# Patient Record
Sex: MALE | Race: WHITE | NOT HISPANIC OR LATINO | URBAN - METROPOLITAN AREA
[De-identification: names, ages, dates, MRNs, and addresses within clinical notes are randomized per-mention and may not be internally consistent; named-entity substitution may affect disease eponyms.]

---

## 2019-05-19 ENCOUNTER — INPATIENT (INPATIENT)
Facility: HOSPITAL | Age: 24
LOS: 39 days | Discharge: HOME | End: 2019-06-28
Attending: PSYCHIATRY & NEUROLOGY | Admitting: PSYCHIATRY & NEUROLOGY
Payer: COMMERCIAL

## 2019-05-19 VITALS
RESPIRATION RATE: 18 BRPM | HEART RATE: 92 BPM | SYSTOLIC BLOOD PRESSURE: 143 MMHG | DIASTOLIC BLOOD PRESSURE: 999 MMHG | TEMPERATURE: 97 F | OXYGEN SATURATION: 100 %

## 2019-05-19 NOTE — ED ADULT TRIAGE NOTE - CHIEF COMPLAINT QUOTE
Pt BIBA for erratic behavior at home.  Pt is hyper verbal and hyper Temple in triage.  Pt recently DC from [facility].  Per family pt has had this behavior since DC on Wed.  pmhx. bipolar & Autism.  denies SI/HI/AVH/ETOH/substance.

## 2019-05-20 DIAGNOSIS — F84.0 AUTISTIC DISORDER: ICD-10-CM

## 2019-05-20 DIAGNOSIS — R69 ILLNESS, UNSPECIFIED: ICD-10-CM

## 2019-05-20 DIAGNOSIS — F29 UNSPECIFIED PSYCHOSIS NOT DUE TO A SUBSTANCE OR KNOWN PHYSIOLOGICAL CONDITION: ICD-10-CM

## 2019-05-20 LAB
ALBUMIN SERPL ELPH-MCNC: 4.8 G/DL — SIGNIFICANT CHANGE UP (ref 3.3–5)
ALP SERPL-CCNC: 54 U/L — SIGNIFICANT CHANGE UP (ref 40–120)
ALT FLD-CCNC: 21 U/L — SIGNIFICANT CHANGE UP (ref 4–41)
AMPHET UR-MCNC: NEGATIVE — SIGNIFICANT CHANGE UP
ANION GAP SERPL CALC-SCNC: 13 MMO/L — SIGNIFICANT CHANGE UP (ref 7–14)
APAP SERPL-MCNC: < 15 UG/ML — LOW (ref 15–25)
AST SERPL-CCNC: 26 U/L — SIGNIFICANT CHANGE UP (ref 4–40)
BARBITURATES UR SCN-MCNC: NEGATIVE — SIGNIFICANT CHANGE UP
BASOPHILS # BLD AUTO: 0.02 K/UL — SIGNIFICANT CHANGE UP (ref 0–0.2)
BASOPHILS NFR BLD AUTO: 0.3 % — SIGNIFICANT CHANGE UP (ref 0–2)
BENZODIAZ UR-MCNC: NEGATIVE — SIGNIFICANT CHANGE UP
BILIRUB SERPL-MCNC: 0.3 MG/DL — SIGNIFICANT CHANGE UP (ref 0.2–1.2)
BUN SERPL-MCNC: 12 MG/DL — SIGNIFICANT CHANGE UP (ref 7–23)
CALCIUM SERPL-MCNC: 9.5 MG/DL — SIGNIFICANT CHANGE UP (ref 8.4–10.5)
CANNABINOIDS UR-MCNC: NEGATIVE — SIGNIFICANT CHANGE UP
CHLORIDE SERPL-SCNC: 105 MMOL/L — SIGNIFICANT CHANGE UP (ref 98–107)
CO2 SERPL-SCNC: 23 MMOL/L — SIGNIFICANT CHANGE UP (ref 22–31)
COCAINE METAB.OTHER UR-MCNC: NEGATIVE — SIGNIFICANT CHANGE UP
CREAT SERPL-MCNC: 0.81 MG/DL — SIGNIFICANT CHANGE UP (ref 0.5–1.3)
EOSINOPHIL # BLD AUTO: 0.08 K/UL — SIGNIFICANT CHANGE UP (ref 0–0.5)
EOSINOPHIL NFR BLD AUTO: 1.2 % — SIGNIFICANT CHANGE UP (ref 0–6)
ETHANOL BLD-MCNC: < 10 MG/DL — SIGNIFICANT CHANGE UP
GLUCOSE SERPL-MCNC: 92 MG/DL — SIGNIFICANT CHANGE UP (ref 70–99)
HCT VFR BLD CALC: 37.4 % — LOW (ref 39–50)
HGB BLD-MCNC: 13.4 G/DL — SIGNIFICANT CHANGE UP (ref 13–17)
IMM GRANULOCYTES NFR BLD AUTO: 0.1 % — SIGNIFICANT CHANGE UP (ref 0–1.5)
LITHIUM SERPL-MCNC: < 0.1 MMOL/L — LOW (ref 0.6–1.2)
LYMPHOCYTES # BLD AUTO: 2.66 K/UL — SIGNIFICANT CHANGE UP (ref 1–3.3)
LYMPHOCYTES # BLD AUTO: 39.8 % — SIGNIFICANT CHANGE UP (ref 13–44)
MAGNESIUM SERPL-MCNC: 2 MG/DL — SIGNIFICANT CHANGE UP (ref 1.6–2.6)
MCHC RBC-ENTMCNC: 33.1 PG — SIGNIFICANT CHANGE UP (ref 27–34)
MCHC RBC-ENTMCNC: 35.8 % — SIGNIFICANT CHANGE UP (ref 32–36)
MCV RBC AUTO: 92.3 FL — SIGNIFICANT CHANGE UP (ref 80–100)
METHADONE UR-MCNC: NEGATIVE — SIGNIFICANT CHANGE UP
MONOCYTES # BLD AUTO: 0.9 K/UL — SIGNIFICANT CHANGE UP (ref 0–0.9)
MONOCYTES NFR BLD AUTO: 13.5 % — SIGNIFICANT CHANGE UP (ref 2–14)
NEUTROPHILS # BLD AUTO: 3.01 K/UL — SIGNIFICANT CHANGE UP (ref 1.8–7.4)
NEUTROPHILS NFR BLD AUTO: 45.1 % — SIGNIFICANT CHANGE UP (ref 43–77)
NRBC # FLD: 0 K/UL — SIGNIFICANT CHANGE UP (ref 0–0)
OPIATES UR-MCNC: NEGATIVE — SIGNIFICANT CHANGE UP
OXYCODONE UR-MCNC: NEGATIVE — SIGNIFICANT CHANGE UP
PCP UR-MCNC: NEGATIVE — SIGNIFICANT CHANGE UP
PHOSPHATE SERPL-MCNC: 3.7 MG/DL — SIGNIFICANT CHANGE UP (ref 2.5–4.5)
PLATELET # BLD AUTO: 212 K/UL — SIGNIFICANT CHANGE UP (ref 150–400)
PMV BLD: 9.1 FL — SIGNIFICANT CHANGE UP (ref 7–13)
POTASSIUM SERPL-MCNC: 4.1 MMOL/L — SIGNIFICANT CHANGE UP (ref 3.5–5.3)
POTASSIUM SERPL-SCNC: 4.1 MMOL/L — SIGNIFICANT CHANGE UP (ref 3.5–5.3)
PROT SERPL-MCNC: 7.2 G/DL — SIGNIFICANT CHANGE UP (ref 6–8.3)
RBC # BLD: 4.05 M/UL — LOW (ref 4.2–5.8)
RBC # FLD: 11.9 % — SIGNIFICANT CHANGE UP (ref 10.3–14.5)
SALICYLATES SERPL-MCNC: < 5 MG/DL — LOW (ref 15–30)
SODIUM SERPL-SCNC: 141 MMOL/L — SIGNIFICANT CHANGE UP (ref 135–145)
TSH SERPL-MCNC: 2.67 UIU/ML — SIGNIFICANT CHANGE UP (ref 0.27–4.2)
VALPROATE SERPL-MCNC: 28 UG/ML — LOW (ref 50–100)
WBC # BLD: 6.68 K/UL — SIGNIFICANT CHANGE UP (ref 3.8–10.5)
WBC # FLD AUTO: 6.68 K/UL — SIGNIFICANT CHANGE UP (ref 3.8–10.5)

## 2019-05-20 PROCEDURE — 99285 EMERGENCY DEPT VISIT HI MDM: CPT | Mod: GC

## 2019-05-20 PROCEDURE — 99231 SBSQ HOSP IP/OBS SF/LOW 25: CPT

## 2019-05-20 RX ORDER — HALOPERIDOL DECANOATE 100 MG/ML
5 INJECTION INTRAMUSCULAR EVERY 6 HOURS
Refills: 0 | Status: DISCONTINUED | OUTPATIENT
Start: 2019-05-20 | End: 2019-05-23

## 2019-05-20 RX ORDER — DIPHENHYDRAMINE HCL 50 MG
50 CAPSULE ORAL EVERY 6 HOURS
Refills: 0 | Status: DISCONTINUED | OUTPATIENT
Start: 2019-05-20 | End: 2019-06-28

## 2019-05-20 RX ORDER — OLANZAPINE 15 MG/1
20 TABLET, FILM COATED ORAL
Refills: 0 | Status: DISCONTINUED | OUTPATIENT
Start: 2019-05-20 | End: 2019-05-22

## 2019-05-20 RX ORDER — HALOPERIDOL DECANOATE 100 MG/ML
5 INJECTION INTRAMUSCULAR ONCE
Refills: 0 | Status: DISCONTINUED | OUTPATIENT
Start: 2019-05-20 | End: 2019-05-23

## 2019-05-20 RX ORDER — DIPHENHYDRAMINE HCL 50 MG
50 CAPSULE ORAL ONCE
Refills: 0 | Status: DISCONTINUED | OUTPATIENT
Start: 2019-05-20 | End: 2019-06-19

## 2019-05-20 RX ORDER — DIVALPROEX SODIUM 500 MG/1
750 TABLET, DELAYED RELEASE ORAL
Refills: 0 | Status: DISCONTINUED | OUTPATIENT
Start: 2019-05-20 | End: 2019-05-29

## 2019-05-20 NOTE — ED PROVIDER NOTE - PHYSICAL EXAMINATION
GEN - NAD; well appearing; A+O x3; non-toxic appearing   CARD -s1s2, RRR, no M,G,R;   PULM - CTA b/l, symmetric breath sounds;   ABD:  +BS, ND, NT, soft, no guarding, no rebound, no masses;   BACK: no CVA tenderness, Normal  spine;   EXT: symmetric pulses, 2+ dp, capillary refill < 2 seconds, no clubbing, no cyanosis, no edema   NEURO: alert, CN 2-12 intact, reflexes nl, sensation nl, coordination nl, finger to nose nl, romberg negative, motor 5/5 RUE/LUE/RLE/LLE/EHL/Plantar flexion, no pronator drift, gait nl  PSYCH: internally preoccupied, pacing, with psychomotor agitation, poor eye contact, with hyper-Restoration statements.  No suicidal or homicidal ideation, no hallucinations.
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
## 2019-05-20 NOTE — ED ADULT NURSE NOTE - CHIEF COMPLAINT QUOTE
Pt BIBA for erratic behavior at home.  Pt is hyper verbal and hyper Christianity in triage.  Pt recently DC from [facility].  Per family pt has had this behavior since DC on Wed.  pmhx. bipolar & Autism.  denies SI/HI/AVH/ETOH/substance.

## 2019-05-20 NOTE — ED BEHAVIORAL HEALTH NOTE - BEHAVIORAL HEALTH NOTE
Contacted [facility] with [facility].  Was informed that stewart bed is available, however pt is not appropriate for this type of bed.

## 2019-05-20 NOTE — ED BEHAVIORAL HEALTH ASSESSMENT NOTE - OTHER PAST PSYCHIATRIC HISTORY (INCLUDE DETAILS REGARDING ONSET, COURSE OF ILLNESS, INPATIENT/OUTPATIENT TREATMENT)
reported psychiatric diagnoses of autism spectrum disorder and bipolar disorder, 4 prior psych hospitalizations since June 2018, recently hospitalized at [facility] from last November until last Wednesday 5/15/19, had been on Zyprexa, Depakote, and Prolixn [place] in the past but stopped PO meds recently.

## 2019-05-20 NOTE — ED BEHAVIORAL HEALTH ASSESSMENT NOTE - PSYCHIATRIC ISSUES AND PLAN (INCLUDE STANDING AND PRN MEDICATION)
Will restart olanzapine 20mg BID and Depakote 750mg BID. PRN agitation: Haldol 5mg PO/IM, Benadryl 50mg PO/IM, Ativan 2mg PO/IM. Will need to clarify with [facility] team further details of patient's medication regimen and past trials, need to confirm when next WASHINGTON dose is due.

## 2019-05-20 NOTE — ED BEHAVIORAL HEALTH NOTE - BEHAVIORAL HEALTH NOTE
patient re-evaluated this AM. remains psychotic and delusional though currently in fair behavioral control. I agree with plan for admission. bed obtained on 2N. meets criteria for 939 legal status. legals completed. sign out given to 2N. Recommend EMS transport to the unit.

## 2019-05-20 NOTE — ED BEHAVIORAL HEALTH ASSESSMENT NOTE - SUMMARY
Patient is a 23 year old man, with reported psychiatric diagnoses of autism spectrum disorder and bipolar disorder, 4 prior psych hospitalizations since June 2018, recently hospitalized at [facility] from last November until last Wednesday 5/15/19, had been on Zyprexa, Depakote, and Prolixn [place] in the past but stopped PO meds recently, BIBEMS activated by family for hyperreligious and disorganized behavior as well as escalating aggression. Collateral reveals that patient has long history of mental illness but had been stable until June 2018, has been decompensated since then. Patient presents acutely psychotic with severe thought disorder, superimposed on baseline autism. Patient is a 23 year old man, with reported psychiatric diagnoses of autism spectrum disorder and bipolar disorder, 4 prior psych hospitalizations since June 2018, recently hospitalized at [facility] from last November until last Wednesday 5/15/19, had been on Zyprexa, Depakote, and Prolixn [place] in the past but stopped PO meds recently, BIBEMS activated by family for hyperreligious and disorganized behavior as well as escalating aggression. Collateral reveals that patient has long history of mental illness but had been stable until June 2018, has been decompensated since then. Patient presents acutely psychotic with severe thought disorder, superimposed on baseline autism. No beds in [facility], will hold in ED until bed available.

## 2019-05-20 NOTE — ED BEHAVIORAL HEALTH ASSESSMENT NOTE - DESCRIPTION
GERD per mother had been admitted to [facility] since November then lived at residential program for 2 days, now living with family since Friday. [facility]. Patient observed shaking and at times punching self in ED but able to be verbally de-escalated, refused PO PRN medications.

## 2019-05-20 NOTE — ED ADULT NURSE NOTE - OBJECTIVE STATEMENT
Ambulatory BIBEMS from home where he was reported to have very spontaneous/erratic behavior as per family. Patient is hyperverbal, pacing around the  hallway, gets very excited and starts shaking and getting anxious as he is waiting for providers. Patient is also hypereligious. Just released from [facility] Wednesday and his behaviour has escalated since he got home. Denies SI/HI, AH/VH, ETOH/substance abuse. Patient has PMH of autism and bipolar disorder. VSS. Safety maintained, needs attended, will continue to monitor.

## 2019-05-20 NOTE — ED BEHAVIORAL HEALTH ASSESSMENT NOTE - CURRENT MEDICATION
olanzapine 20mg BID, Depakote 750mg BID, and prolixin W[place] next due on 5/24/19. Patient has refused PO meds since last Wednesday 5/15/19
28-Nov-2017

## 2019-05-20 NOTE — ED PROVIDER NOTE - CLINICAL SUMMARY MEDICAL DECISION MAKING FREE TEXT BOX
23 year old male a/w worsening Protestant and disorganized behavior, no homicidal or suicidal ideations.  - cbc, cmp, ekg

## 2019-05-20 NOTE — ED PROVIDER NOTE - OBJECTIVE STATEMENT
23 year old male with autism ?bipolar arrived with hyper-Jewish behavior, disorganized behavior, with flight of ideas.  Patient was recently admitted to [facility] and was noted to be discharged on Wednesday.  Pt reports not wanting to take medications because "they are not from God."  Pt reports he needs to follow the word of the Qaran and the Bible.  Pt reports he does not want to hurt himself or anyone else, because the bible says so.  During interview patient was pacing, appearing internally pre-occupied.

## 2019-05-20 NOTE — ED BEHAVIORAL HEALTH ASSESSMENT NOTE - RISK ASSESSMENT
Patient is at high acute risk of harm to self and/or others and requires inpatient hospitalization for stabilization and safety. Risk factors include acute thought disorder, paranoia, severe agitation/anxiety, noncompliance with meds, not engaged with treatment. Patient is at high acute risk of harm to self and/or others and requires inpatient hospitalization for stabilization and safety. Risk factors include acute thought disorder, paranoia, severe agitation/anxiety, noncompliance with meds, not engaged with treatment. No need for 1;1 CO as denies SI/HI and was able to maintain better behavioral control during stay in the ED. Family have acute safety concern.

## 2019-05-20 NOTE — ED BEHAVIORAL HEALTH ASSESSMENT NOTE - DETAILS
[facility] from November until 5/25/19 unavailable will handoff when bed is available spoke to Dr. Ross unavailable after hours Dr. Rodriguez

## 2019-05-21 PROCEDURE — 99222 1ST HOSP IP/OBS MODERATE 55: CPT | Mod: GC

## 2019-05-22 PROCEDURE — 99232 SBSQ HOSP IP/OBS MODERATE 35: CPT

## 2019-05-22 RX ORDER — OLANZAPINE 15 MG/1
10 TABLET, FILM COATED ORAL
Refills: 0 | Status: DISCONTINUED | OUTPATIENT
Start: 2019-05-22 | End: 2019-05-29

## 2019-05-22 RX ADMIN — OLANZAPINE 20 MILLIGRAM(S): 15 TABLET, FILM COATED ORAL at 09:07

## 2019-05-22 RX ADMIN — Medication 2 MILLIGRAM(S): at 08:31

## 2019-05-23 PROCEDURE — 99232 SBSQ HOSP IP/OBS MODERATE 35: CPT | Mod: GC

## 2019-05-23 RX ORDER — OLANZAPINE 15 MG/1
5 TABLET, FILM COATED ORAL EVERY 6 HOURS
Refills: 0 | Status: DISCONTINUED | OUTPATIENT
Start: 2019-05-23 | End: 2019-05-24

## 2019-05-23 RX ORDER — OLANZAPINE 15 MG/1
10 TABLET, FILM COATED ORAL ONCE
Refills: 0 | Status: DISCONTINUED | OUTPATIENT
Start: 2019-05-23 | End: 2019-05-23

## 2019-05-23 RX ORDER — OLANZAPINE 15 MG/1
10 TABLET, FILM COATED ORAL ONCE
Refills: 0 | Status: DISCONTINUED | OUTPATIENT
Start: 2019-05-23 | End: 2019-05-24

## 2019-05-23 RX ORDER — OLANZAPINE 15 MG/1
10 TABLET, FILM COATED ORAL ONCE
Refills: 0 | Status: DISCONTINUED | OUTPATIENT
Start: 2019-05-23 | End: 2019-05-29

## 2019-05-24 PROCEDURE — 99232 SBSQ HOSP IP/OBS MODERATE 35: CPT

## 2019-05-24 RX ORDER — DIPHENHYDRAMINE HCL 50 MG
50 CAPSULE ORAL ONCE
Refills: 0 | Status: COMPLETED | OUTPATIENT
Start: 2019-05-24 | End: 2019-05-24

## 2019-05-24 RX ORDER — HALOPERIDOL DECANOATE 100 MG/ML
7.5 INJECTION INTRAMUSCULAR ONCE
Refills: 0 | Status: DISCONTINUED | OUTPATIENT
Start: 2019-05-24 | End: 2019-06-11

## 2019-05-24 RX ORDER — OLANZAPINE 15 MG/1
10 TABLET, FILM COATED ORAL EVERY 8 HOURS
Refills: 0 | Status: DISCONTINUED | OUTPATIENT
Start: 2019-05-24 | End: 2019-05-29

## 2019-05-24 RX ORDER — HALOPERIDOL DECANOATE 100 MG/ML
7.5 INJECTION INTRAMUSCULAR ONCE
Refills: 0 | Status: COMPLETED | OUTPATIENT
Start: 2019-05-24 | End: 2019-05-24

## 2019-05-24 RX ORDER — OLANZAPINE 15 MG/1
10 TABLET, FILM COATED ORAL ONCE
Refills: 0 | Status: DISCONTINUED | OUTPATIENT
Start: 2019-05-24 | End: 2019-05-24

## 2019-05-24 RX ORDER — HALOPERIDOL DECANOATE 100 MG/ML
5 INJECTION INTRAMUSCULAR EVERY 6 HOURS
Refills: 0 | Status: DISCONTINUED | OUTPATIENT
Start: 2019-05-24 | End: 2019-06-28

## 2019-05-24 RX ADMIN — HALOPERIDOL DECANOATE 7.5 MILLIGRAM(S): 100 INJECTION INTRAMUSCULAR at 20:35

## 2019-05-24 RX ADMIN — Medication 50 MILLIGRAM(S): at 20:35

## 2019-05-24 RX ADMIN — OLANZAPINE 10 MILLIGRAM(S): 15 TABLET, FILM COATED ORAL at 19:52

## 2019-05-25 PROCEDURE — 99232 SBSQ HOSP IP/OBS MODERATE 35: CPT

## 2019-05-25 RX ORDER — OLANZAPINE 15 MG/1
10 TABLET, FILM COATED ORAL ONCE
Refills: 0 | Status: COMPLETED | OUTPATIENT
Start: 2019-05-25 | End: 2019-05-25

## 2019-05-25 RX ADMIN — Medication 2 MILLIGRAM(S): at 11:25

## 2019-05-25 RX ADMIN — OLANZAPINE 10 MILLIGRAM(S): 15 TABLET, FILM COATED ORAL at 17:33

## 2019-05-25 NOTE — CHART NOTE - NSCHARTNOTEFT_GEN_A_CORE
22 yo M with PMH of autism is seen after pt banged his head and hands against the wall multiple times. As per staff, pt became agitated and started banging his head and both hands against the wall in his room and day room several times. Pt is drowsy but easily arousable. He is responding to questions, but unable to follow commands. Denies having any pain at this time. No falls or LOC reported. No bleeding, bruising or skin tears noted on the frontal region of the head or dorsal aspect of B/L hand. No tenderness to palpation noted. Pt not willing to perform ROM of hands or form a fist at this time. Pulses and sensation intact. No further medical intervention indicated at this time. Staff advised to monitor pt for any worsening symptoms.

## 2019-05-27 RX ORDER — OLANZAPINE 15 MG/1
10 TABLET, FILM COATED ORAL ONCE
Refills: 0 | Status: DISCONTINUED | OUTPATIENT
Start: 2019-05-27 | End: 2019-05-29

## 2019-05-28 PROCEDURE — 99232 SBSQ HOSP IP/OBS MODERATE 35: CPT

## 2019-05-29 PROCEDURE — 99232 SBSQ HOSP IP/OBS MODERATE 35: CPT

## 2019-05-29 RX ORDER — OLANZAPINE 15 MG/1
10 TABLET, FILM COATED ORAL ONCE
Refills: 0 | Status: COMPLETED | OUTPATIENT
Start: 2019-05-29 | End: 2019-05-29

## 2019-05-29 RX ORDER — DIVALPROEX SODIUM 500 MG/1
500 TABLET, DELAYED RELEASE ORAL ONCE
Refills: 0 | Status: COMPLETED | OUTPATIENT
Start: 2019-05-29 | End: 2019-05-29

## 2019-05-29 RX ORDER — OLANZAPINE 15 MG/1
10 TABLET, FILM COATED ORAL EVERY 12 HOURS
Refills: 0 | Status: DISCONTINUED | OUTPATIENT
Start: 2019-05-29 | End: 2019-05-30

## 2019-05-29 RX ADMIN — OLANZAPINE 10 MILLIGRAM(S): 15 TABLET, FILM COATED ORAL at 11:22

## 2019-05-29 RX ADMIN — DIVALPROEX SODIUM 500 MILLIGRAM(S): 500 TABLET, DELAYED RELEASE ORAL at 11:22

## 2019-05-30 PROCEDURE — 99232 SBSQ HOSP IP/OBS MODERATE 35: CPT

## 2019-05-30 RX ORDER — OLANZAPINE 15 MG/1
10 TABLET, FILM COATED ORAL AT BEDTIME
Refills: 0 | Status: DISCONTINUED | OUTPATIENT
Start: 2019-05-30 | End: 2019-06-28

## 2019-05-30 RX ADMIN — OLANZAPINE 10 MILLIGRAM(S): 15 TABLET, FILM COATED ORAL at 22:40

## 2019-05-31 PROCEDURE — 99232 SBSQ HOSP IP/OBS MODERATE 35: CPT

## 2019-05-31 RX ADMIN — OLANZAPINE 10 MILLIGRAM(S): 15 TABLET, FILM COATED ORAL at 21:03

## 2019-06-01 RX ADMIN — OLANZAPINE 10 MILLIGRAM(S): 15 TABLET, FILM COATED ORAL at 21:14

## 2019-06-02 RX ORDER — ACETAMINOPHEN 500 MG
650 TABLET ORAL ONCE
Refills: 0 | Status: COMPLETED | OUTPATIENT
Start: 2019-06-02 | End: 2019-06-02

## 2019-06-02 RX ADMIN — OLANZAPINE 10 MILLIGRAM(S): 15 TABLET, FILM COATED ORAL at 21:00

## 2019-06-02 NOTE — CHART NOTE - NSCHARTNOTEFT_GEN_A_CORE
Called to assess 23y.o. M patient with PMHx significant for Autism after banging his head and punching the wall 3-6 times on walls and glass. Pt seen and examined at bedside. As per RN, Psych emergency was called on pt's roommate for agitation in addition to witnessing other patients on this floor expressing verbal and physical aggression towards fellow patients and staff on this unit became too much and patient "coped" by banging head and hands against wall. Upon arrival to the floor patient is seen walking throughout the unit until going to his room with steady gait. Physical exam revealed no open lacerations or skin tears but mild ecchymosis on frontal medial aspect of forehead near hairline border that is nontender to palpation. No open wounds, ecchymosis, or tenderness to palpation on bilateral dorsal and ventral aspects of metacarpal joints or wrists. Active and Passive ROM present without any difficulties or deficits. 2+ Radial pulses present bilaterally. Motor and sensory intact bilaterally. Pt reports "he is not in pain" and denies cool compress that was offered. Will offer Tylenol 650mg PO x 1 for pain in addition to cool compress to be applied to ecchymotic area on forehead. RN advised to monitor for worsening symptoms (change in AMS, worsening ecchymosis, visual changes, nausea, vomiting). Will continue to monitor patient overnight.     Medical PA [id number]

## 2019-06-03 PROCEDURE — 99232 SBSQ HOSP IP/OBS MODERATE 35: CPT

## 2019-06-03 RX ADMIN — OLANZAPINE 10 MILLIGRAM(S): 15 TABLET, FILM COATED ORAL at 21:00

## 2019-06-04 PROCEDURE — 99231 SBSQ HOSP IP/OBS SF/LOW 25: CPT

## 2019-06-04 RX ADMIN — OLANZAPINE 10 MILLIGRAM(S): 15 TABLET, FILM COATED ORAL at 20:42

## 2019-06-05 PROCEDURE — 99231 SBSQ HOSP IP/OBS SF/LOW 25: CPT

## 2019-06-05 RX ADMIN — OLANZAPINE 10 MILLIGRAM(S): 15 TABLET, FILM COATED ORAL at 21:55

## 2019-06-06 PROCEDURE — 99231 SBSQ HOSP IP/OBS SF/LOW 25: CPT

## 2019-06-06 RX ADMIN — OLANZAPINE 10 MILLIGRAM(S): 15 TABLET, FILM COATED ORAL at 21:37

## 2019-06-07 PROCEDURE — 99231 SBSQ HOSP IP/OBS SF/LOW 25: CPT

## 2019-06-07 RX ORDER — HALOPERIDOL DECANOATE 100 MG/ML
7.5 INJECTION INTRAMUSCULAR ONCE
Refills: 0 | Status: DISCONTINUED | OUTPATIENT
Start: 2019-06-07 | End: 2019-06-09

## 2019-06-07 RX ORDER — DIPHENHYDRAMINE HCL 50 MG
50 CAPSULE ORAL ONCE
Refills: 0 | Status: DISCONTINUED | OUTPATIENT
Start: 2019-06-07 | End: 2019-06-09

## 2019-06-07 RX ADMIN — OLANZAPINE 10 MILLIGRAM(S): 15 TABLET, FILM COATED ORAL at 21:17

## 2019-06-08 RX ORDER — HALOPERIDOL DECANOATE 100 MG/ML
7.5 INJECTION INTRAMUSCULAR ONCE
Refills: 0 | Status: DISCONTINUED | OUTPATIENT
Start: 2019-06-08 | End: 2019-06-09

## 2019-06-08 RX ORDER — DIPHENHYDRAMINE HCL 50 MG
50 CAPSULE ORAL ONCE
Refills: 0 | Status: DISCONTINUED | OUTPATIENT
Start: 2019-06-08 | End: 2019-06-09

## 2019-06-08 RX ADMIN — Medication 50 MILLIGRAM(S): at 00:08

## 2019-06-08 RX ADMIN — HALOPERIDOL DECANOATE 5 MILLIGRAM(S): 100 INJECTION INTRAMUSCULAR at 00:08

## 2019-06-08 RX ADMIN — OLANZAPINE 10 MILLIGRAM(S): 15 TABLET, FILM COATED ORAL at 21:32

## 2019-06-09 RX ORDER — HALOPERIDOL DECANOATE 100 MG/ML
7.5 INJECTION INTRAMUSCULAR ONCE
Refills: 0 | Status: COMPLETED | OUTPATIENT
Start: 2019-06-09 | End: 2019-06-09

## 2019-06-09 RX ORDER — DIPHENHYDRAMINE HCL 50 MG
50 CAPSULE ORAL ONCE
Refills: 0 | Status: COMPLETED | OUTPATIENT
Start: 2019-06-09 | End: 2019-06-09

## 2019-06-09 RX ADMIN — OLANZAPINE 10 MILLIGRAM(S): 15 TABLET, FILM COATED ORAL at 21:46

## 2019-06-09 RX ADMIN — Medication 3 MILLIGRAM(S): at 18:30

## 2019-06-09 RX ADMIN — HALOPERIDOL DECANOATE 7.5 MILLIGRAM(S): 100 INJECTION INTRAMUSCULAR at 18:30

## 2019-06-09 RX ADMIN — Medication 50 MILLIGRAM(S): at 18:30

## 2019-06-10 PROCEDURE — 99231 SBSQ HOSP IP/OBS SF/LOW 25: CPT

## 2019-06-10 RX ORDER — HALOPERIDOL DECANOATE 100 MG/ML
5 INJECTION INTRAMUSCULAR ONCE
Refills: 0 | Status: DISCONTINUED | OUTPATIENT
Start: 2019-06-10 | End: 2019-06-10

## 2019-06-10 RX ORDER — DIPHENHYDRAMINE HCL 50 MG
50 CAPSULE ORAL ONCE
Refills: 0 | Status: DISCONTINUED | OUTPATIENT
Start: 2019-06-10 | End: 2019-06-10

## 2019-06-10 RX ORDER — HALOPERIDOL DECANOATE 100 MG/ML
7.5 INJECTION INTRAMUSCULAR ONCE
Refills: 0 | Status: COMPLETED | OUTPATIENT
Start: 2019-06-10 | End: 2019-06-10

## 2019-06-10 RX ORDER — HALOPERIDOL DECANOATE 100 MG/ML
7.5 INJECTION INTRAMUSCULAR ONCE
Refills: 0 | Status: DISCONTINUED | OUTPATIENT
Start: 2019-06-10 | End: 2019-06-10

## 2019-06-10 RX ADMIN — HALOPERIDOL DECANOATE 7.5 MILLIGRAM(S): 100 INJECTION INTRAMUSCULAR at 18:05

## 2019-06-10 RX ADMIN — OLANZAPINE 10 MILLIGRAM(S): 15 TABLET, FILM COATED ORAL at 19:56

## 2019-06-10 RX ADMIN — Medication 50 MILLIGRAM(S): at 18:05

## 2019-06-10 RX ADMIN — Medication 3 MILLIGRAM(S): at 18:05

## 2019-06-11 PROCEDURE — 99231 SBSQ HOSP IP/OBS SF/LOW 25: CPT

## 2019-06-11 RX ORDER — HALOPERIDOL DECANOATE 100 MG/ML
5 INJECTION INTRAMUSCULAR ONCE
Refills: 0 | Status: DISCONTINUED | OUTPATIENT
Start: 2019-06-11 | End: 2019-06-15

## 2019-06-11 RX ADMIN — OLANZAPINE 10 MILLIGRAM(S): 15 TABLET, FILM COATED ORAL at 20:28

## 2019-06-12 PROCEDURE — 99231 SBSQ HOSP IP/OBS SF/LOW 25: CPT

## 2019-06-12 RX ADMIN — OLANZAPINE 10 MILLIGRAM(S): 15 TABLET, FILM COATED ORAL at 22:31

## 2019-06-13 PROCEDURE — 99231 SBSQ HOSP IP/OBS SF/LOW 25: CPT

## 2019-06-13 RX ADMIN — OLANZAPINE 10 MILLIGRAM(S): 15 TABLET, FILM COATED ORAL at 22:12

## 2019-06-14 PROCEDURE — 99231 SBSQ HOSP IP/OBS SF/LOW 25: CPT

## 2019-06-14 RX ADMIN — OLANZAPINE 10 MILLIGRAM(S): 15 TABLET, FILM COATED ORAL at 22:06

## 2019-06-14 RX ADMIN — Medication 50 MILLIGRAM(S): at 22:07

## 2019-06-15 RX ORDER — DIPHENHYDRAMINE HCL 50 MG
50 CAPSULE ORAL ONCE
Refills: 0 | Status: DISCONTINUED | OUTPATIENT
Start: 2019-06-15 | End: 2019-06-15

## 2019-06-15 RX ORDER — DIPHENHYDRAMINE HCL 50 MG
50 CAPSULE ORAL ONCE
Refills: 0 | Status: DISCONTINUED | OUTPATIENT
Start: 2019-06-15 | End: 2019-06-28

## 2019-06-15 RX ORDER — HALOPERIDOL DECANOATE 100 MG/ML
7.5 INJECTION INTRAMUSCULAR ONCE
Refills: 0 | Status: DISCONTINUED | OUTPATIENT
Start: 2019-06-15 | End: 2019-06-19

## 2019-06-15 RX ORDER — HALOPERIDOL DECANOATE 100 MG/ML
7.5 INJECTION INTRAMUSCULAR ONCE
Refills: 0 | Status: DISCONTINUED | OUTPATIENT
Start: 2019-06-15 | End: 2019-06-15

## 2019-06-15 RX ADMIN — Medication 50 MILLIGRAM(S): at 18:10

## 2019-06-15 RX ADMIN — HALOPERIDOL DECANOATE 5 MILLIGRAM(S): 100 INJECTION INTRAMUSCULAR at 18:10

## 2019-06-15 RX ADMIN — Medication 2 MILLIGRAM(S): at 18:10

## 2019-06-16 RX ADMIN — OLANZAPINE 10 MILLIGRAM(S): 15 TABLET, FILM COATED ORAL at 19:40

## 2019-06-17 PROCEDURE — 99231 SBSQ HOSP IP/OBS SF/LOW 25: CPT

## 2019-06-17 RX ADMIN — OLANZAPINE 10 MILLIGRAM(S): 15 TABLET, FILM COATED ORAL at 23:50

## 2019-06-18 PROCEDURE — 99231 SBSQ HOSP IP/OBS SF/LOW 25: CPT

## 2019-06-18 RX ADMIN — OLANZAPINE 10 MILLIGRAM(S): 15 TABLET, FILM COATED ORAL at 21:40

## 2019-06-19 PROCEDURE — 99232 SBSQ HOSP IP/OBS MODERATE 35: CPT

## 2019-06-19 RX ORDER — HALOPERIDOL DECANOATE 100 MG/ML
5 INJECTION INTRAMUSCULAR ONCE
Refills: 0 | Status: DISCONTINUED | OUTPATIENT
Start: 2019-06-19 | End: 2019-06-28

## 2019-06-19 RX ORDER — HALOPERIDOL DECANOATE 100 MG/ML
5 INJECTION INTRAMUSCULAR ONCE
Refills: 0 | Status: DISCONTINUED | OUTPATIENT
Start: 2019-06-19 | End: 2019-06-20

## 2019-06-19 RX ORDER — DIPHENHYDRAMINE HCL 50 MG
50 CAPSULE ORAL ONCE
Refills: 0 | Status: DISCONTINUED | OUTPATIENT
Start: 2019-06-19 | End: 2019-06-20

## 2019-06-19 RX ADMIN — OLANZAPINE 10 MILLIGRAM(S): 15 TABLET, FILM COATED ORAL at 20:49

## 2019-06-20 PROCEDURE — 99231 SBSQ HOSP IP/OBS SF/LOW 25: CPT

## 2019-06-20 RX ORDER — HALOPERIDOL DECANOATE 100 MG/ML
5 INJECTION INTRAMUSCULAR ONCE
Refills: 0 | Status: DISCONTINUED | OUTPATIENT
Start: 2019-06-20 | End: 2019-06-21

## 2019-06-20 RX ORDER — DIPHENHYDRAMINE HCL 50 MG
50 CAPSULE ORAL ONCE
Refills: 0 | Status: DISCONTINUED | OUTPATIENT
Start: 2019-06-20 | End: 2019-06-21

## 2019-06-20 RX ADMIN — OLANZAPINE 10 MILLIGRAM(S): 15 TABLET, FILM COATED ORAL at 20:11

## 2019-06-21 VITALS — TEMPERATURE: 98 F

## 2019-06-21 RX ORDER — HALOPERIDOL DECANOATE 100 MG/ML
5 INJECTION INTRAMUSCULAR ONCE
Refills: 0 | Status: COMPLETED | OUTPATIENT
Start: 2019-06-21 | End: 2019-06-21

## 2019-06-21 RX ORDER — DIPHENHYDRAMINE HCL 50 MG
50 CAPSULE ORAL ONCE
Refills: 0 | Status: COMPLETED | OUTPATIENT
Start: 2019-06-21 | End: 2019-06-21

## 2019-06-21 RX ADMIN — HALOPERIDOL DECANOATE 5 MILLIGRAM(S): 100 INJECTION INTRAMUSCULAR at 18:10

## 2019-06-21 RX ADMIN — Medication 50 MILLIGRAM(S): at 18:08

## 2019-06-21 RX ADMIN — Medication 2 MILLIGRAM(S): at 18:10

## 2019-06-21 RX ADMIN — OLANZAPINE 10 MILLIGRAM(S): 15 TABLET, FILM COATED ORAL at 21:11

## 2019-06-22 RX ADMIN — OLANZAPINE 10 MILLIGRAM(S): 15 TABLET, FILM COATED ORAL at 20:18

## 2019-06-23 RX ORDER — DIPHENHYDRAMINE HCL 50 MG
50 CAPSULE ORAL ONCE
Refills: 0 | Status: COMPLETED | OUTPATIENT
Start: 2019-06-23 | End: 2019-06-23

## 2019-06-23 RX ORDER — HALOPERIDOL DECANOATE 100 MG/ML
5 INJECTION INTRAMUSCULAR ONCE
Refills: 0 | Status: COMPLETED | OUTPATIENT
Start: 2019-06-23 | End: 2019-06-23

## 2019-06-23 RX ADMIN — OLANZAPINE 10 MILLIGRAM(S): 15 TABLET, FILM COATED ORAL at 22:32

## 2019-06-23 RX ADMIN — HALOPERIDOL DECANOATE 5 MILLIGRAM(S): 100 INJECTION INTRAMUSCULAR at 20:00

## 2019-06-23 RX ADMIN — Medication 50 MILLIGRAM(S): at 20:00

## 2019-06-23 RX ADMIN — Medication 2 MILLIGRAM(S): at 20:00

## 2019-06-23 NOTE — CHART NOTE - NSCHARTNOTEFT_GEN_A_CORE
Called by RN to assess patient; he is s/p head-banging on the floor for which he was seen by psych and medicated accordingly per RN.  Neuro exam was without any acute findings; PERRL.  There was no skull, temporal, or facial tenderness on palpation; no ecchymosis or swelling noted.  The patient denied pain.

## 2019-06-24 PROCEDURE — 99231 SBSQ HOSP IP/OBS SF/LOW 25: CPT

## 2019-06-24 RX ADMIN — OLANZAPINE 10 MILLIGRAM(S): 15 TABLET, FILM COATED ORAL at 22:23

## 2019-06-25 PROCEDURE — 99231 SBSQ HOSP IP/OBS SF/LOW 25: CPT

## 2019-06-25 RX ORDER — PROPRANOLOL HCL 160 MG
10 CAPSULE, EXTENDED RELEASE 24HR ORAL
Refills: 0 | Status: DISCONTINUED | OUTPATIENT
Start: 2019-06-25 | End: 2019-06-27

## 2019-06-25 RX ADMIN — OLANZAPINE 10 MILLIGRAM(S): 15 TABLET, FILM COATED ORAL at 21:26

## 2019-06-26 PROCEDURE — 99231 SBSQ HOSP IP/OBS SF/LOW 25: CPT

## 2019-06-26 RX ADMIN — OLANZAPINE 10 MILLIGRAM(S): 15 TABLET, FILM COATED ORAL at 20:19

## 2019-06-27 PROCEDURE — 99231 SBSQ HOSP IP/OBS SF/LOW 25: CPT

## 2019-06-27 RX ORDER — FLUPHENAZINE HYDROCHLORIDE 1 MG/1
0 TABLET, FILM COATED ORAL
Qty: 0 | Refills: 0 | DISCHARGE

## 2019-06-27 RX ORDER — DIPHENHYDRAMINE HCL 50 MG
50 CAPSULE ORAL ONCE
Refills: 0 | Status: DISCONTINUED | OUTPATIENT
Start: 2019-06-27 | End: 2019-06-28

## 2019-06-27 RX ORDER — OLANZAPINE 15 MG/1
1 TABLET, FILM COATED ORAL
Qty: 0 | Refills: 0 | DISCHARGE

## 2019-06-27 RX ORDER — DIVALPROEX SODIUM 500 MG/1
750 TABLET, DELAYED RELEASE ORAL
Qty: 0 | Refills: 0 | DISCHARGE

## 2019-06-27 RX ORDER — HALOPERIDOL DECANOATE 100 MG/ML
5 INJECTION INTRAMUSCULAR ONCE
Refills: 0 | Status: DISCONTINUED | OUTPATIENT
Start: 2019-06-27 | End: 2019-06-28

## 2019-06-27 RX ORDER — OLANZAPINE 15 MG/1
1 TABLET, FILM COATED ORAL
Qty: 14 | Refills: 0
Start: 2019-06-27 | End: 2019-07-10

## 2019-06-27 RX ADMIN — OLANZAPINE 10 MILLIGRAM(S): 15 TABLET, FILM COATED ORAL at 21:34

## 2019-06-28 PROCEDURE — 99238 HOSP IP/OBS DSCHRG MGMT 30/<: CPT

## 2020-05-14 ENCOUNTER — INPATIENT (INPATIENT)
Facility: HOSPITAL | Age: 25
LOS: 4 days | Discharge: HOME | End: 2020-05-19
Attending: PSYCHIATRY & NEUROLOGY | Admitting: PSYCHIATRY & NEUROLOGY
Payer: MEDICAID

## 2020-05-14 VITALS — OXYGEN SATURATION: 98 % | HEART RATE: 87 BPM | RESPIRATION RATE: 14 BRPM

## 2020-05-14 DIAGNOSIS — R45.1 RESTLESSNESS AND AGITATION: ICD-10-CM

## 2020-05-14 DIAGNOSIS — F31.2 BIPOLAR DISORDER, CURRENT EPISODE MANIC SEVERE WITH PSYCHOTIC FEATURES: ICD-10-CM

## 2020-05-14 DIAGNOSIS — F84.0 AUTISTIC DISORDER: ICD-10-CM

## 2020-05-14 LAB
ALBUMIN SERPL ELPH-MCNC: 4.6 G/DL — SIGNIFICANT CHANGE UP (ref 3.3–5)
ALP SERPL-CCNC: 73 U/L — SIGNIFICANT CHANGE UP (ref 40–120)
ALT FLD-CCNC: 30 U/L — SIGNIFICANT CHANGE UP (ref 4–41)
ANION GAP SERPL CALC-SCNC: 11 MMO/L — SIGNIFICANT CHANGE UP (ref 7–14)
APAP SERPL-MCNC: < 15 UG/ML — LOW (ref 15–25)
AST SERPL-CCNC: 31 U/L — SIGNIFICANT CHANGE UP (ref 4–40)
BASE EXCESS BLDV CALC-SCNC: 1.4 MMOL/L — SIGNIFICANT CHANGE UP
BASOPHILS # BLD AUTO: 0.03 K/UL — SIGNIFICANT CHANGE UP (ref 0–0.2)
BASOPHILS NFR BLD AUTO: 0.4 % — SIGNIFICANT CHANGE UP (ref 0–2)
BILIRUB SERPL-MCNC: 0.3 MG/DL — SIGNIFICANT CHANGE UP (ref 0.2–1.2)
BLOOD GAS VENOUS - CREATININE: 0.84 MG/DL — SIGNIFICANT CHANGE UP (ref 0.5–1.3)
BLOOD GAS VENOUS - FIO2: 21 — SIGNIFICANT CHANGE UP
BUN SERPL-MCNC: 20 MG/DL — SIGNIFICANT CHANGE UP (ref 7–23)
CALCIUM SERPL-MCNC: 9.3 MG/DL — SIGNIFICANT CHANGE UP (ref 8.4–10.5)
CHLORIDE BLDV-SCNC: 106 MMOL/L — SIGNIFICANT CHANGE UP (ref 96–108)
CHLORIDE SERPL-SCNC: 106 MMOL/L — SIGNIFICANT CHANGE UP (ref 98–107)
CK SERPL-CCNC: 457 U/L — HIGH (ref 30–200)
CO2 SERPL-SCNC: 22 MMOL/L — SIGNIFICANT CHANGE UP (ref 22–31)
CREAT SERPL-MCNC: 0.84 MG/DL — SIGNIFICANT CHANGE UP (ref 0.5–1.3)
EOSINOPHIL # BLD AUTO: 0.04 K/UL — SIGNIFICANT CHANGE UP (ref 0–0.5)
EOSINOPHIL NFR BLD AUTO: 0.5 % — SIGNIFICANT CHANGE UP (ref 0–6)
ETHANOL BLD-MCNC: < 10 MG/DL — SIGNIFICANT CHANGE UP
GAS PNL BLDV: 137 MMOL/L — SIGNIFICANT CHANGE UP (ref 136–146)
GLUCOSE BLDV-MCNC: 128 MG/DL — HIGH (ref 70–99)
GLUCOSE SERPL-MCNC: 133 MG/DL — HIGH (ref 70–99)
HCO3 BLDV-SCNC: 25 MMOL/L — SIGNIFICANT CHANGE UP (ref 20–27)
HCT VFR BLD CALC: 42 % — SIGNIFICANT CHANGE UP (ref 39–50)
HCT VFR BLDV CALC: 45.6 % — SIGNIFICANT CHANGE UP (ref 39–51)
HGB BLD-MCNC: 14.6 G/DL — SIGNIFICANT CHANGE UP (ref 13–17)
HGB BLDV-MCNC: 14.9 G/DL — SIGNIFICANT CHANGE UP (ref 13–17)
IMM GRANULOCYTES NFR BLD AUTO: 0.1 % — SIGNIFICANT CHANGE UP (ref 0–1.5)
LACTATE BLDV-MCNC: 1.5 MMOL/L — SIGNIFICANT CHANGE UP (ref 0.5–2)
LYMPHOCYTES # BLD AUTO: 1.85 K/UL — SIGNIFICANT CHANGE UP (ref 1–3.3)
LYMPHOCYTES # BLD AUTO: 23.5 % — SIGNIFICANT CHANGE UP (ref 13–44)
MCHC RBC-ENTMCNC: 31.3 PG — SIGNIFICANT CHANGE UP (ref 27–34)
MCHC RBC-ENTMCNC: 34.8 % — SIGNIFICANT CHANGE UP (ref 32–36)
MCV RBC AUTO: 89.9 FL — SIGNIFICANT CHANGE UP (ref 80–100)
MONOCYTES # BLD AUTO: 0.76 K/UL — SIGNIFICANT CHANGE UP (ref 0–0.9)
MONOCYTES NFR BLD AUTO: 9.6 % — SIGNIFICANT CHANGE UP (ref 2–14)
NEUTROPHILS # BLD AUTO: 5.19 K/UL — SIGNIFICANT CHANGE UP (ref 1.8–7.4)
NEUTROPHILS NFR BLD AUTO: 65.9 % — SIGNIFICANT CHANGE UP (ref 43–77)
NRBC # FLD: 0 K/UL — SIGNIFICANT CHANGE UP (ref 0–0)
PCO2 BLDV: 50 MMHG — SIGNIFICANT CHANGE UP (ref 41–51)
PH BLDV: 7.35 PH — SIGNIFICANT CHANGE UP (ref 7.32–7.43)
PLATELET # BLD AUTO: 259 K/UL — SIGNIFICANT CHANGE UP (ref 150–400)
PMV BLD: 9.5 FL — SIGNIFICANT CHANGE UP (ref 7–13)
PO2 BLDV: 56 MMHG — HIGH (ref 35–40)
POTASSIUM BLDV-SCNC: 4.8 MMOL/L — HIGH (ref 3.4–4.5)
POTASSIUM SERPL-MCNC: 4.4 MMOL/L — SIGNIFICANT CHANGE UP (ref 3.5–5.3)
POTASSIUM SERPL-SCNC: 4.4 MMOL/L — SIGNIFICANT CHANGE UP (ref 3.5–5.3)
PROT SERPL-MCNC: 7.1 G/DL — SIGNIFICANT CHANGE UP (ref 6–8.3)
RBC # BLD: 4.67 M/UL — SIGNIFICANT CHANGE UP (ref 4.2–5.8)
RBC # FLD: 12.3 % — SIGNIFICANT CHANGE UP (ref 10.3–14.5)
SALICYLATES SERPL-MCNC: < 5 MG/DL — LOW (ref 15–30)
SAO2 % BLDV: 86.6 % — HIGH (ref 60–85)
SARS-COV-2 RNA SPEC QL NAA+PROBE: SIGNIFICANT CHANGE UP
SODIUM SERPL-SCNC: 139 MMOL/L — SIGNIFICANT CHANGE UP (ref 135–145)
TSH SERPL-MCNC: 1.17 UIU/ML — SIGNIFICANT CHANGE UP (ref 0.27–4.2)
WBC # BLD: 7.88 K/UL — SIGNIFICANT CHANGE UP (ref 3.8–10.5)
WBC # FLD AUTO: 7.88 K/UL — SIGNIFICANT CHANGE UP (ref 3.8–10.5)

## 2020-05-14 PROCEDURE — 99285 EMERGENCY DEPT VISIT HI MDM: CPT

## 2020-05-14 PROCEDURE — 70450 CT HEAD/BRAIN W/O DYE: CPT | Mod: 26

## 2020-05-14 RX ORDER — HALOPERIDOL DECANOATE 100 MG/ML
5 INJECTION INTRAMUSCULAR EVERY 6 HOURS
Refills: 0 | Status: DISCONTINUED | OUTPATIENT
Start: 2020-05-15 | End: 2020-05-19

## 2020-05-14 RX ORDER — HALOPERIDOL DECANOATE 100 MG/ML
5 INJECTION INTRAMUSCULAR ONCE
Refills: 0 | Status: DISCONTINUED | OUTPATIENT
Start: 2020-05-15 | End: 2020-05-19

## 2020-05-14 RX ORDER — ONDANSETRON 8 MG/1
4 TABLET, FILM COATED ORAL ONCE
Refills: 0 | Status: COMPLETED | OUTPATIENT
Start: 2020-05-14 | End: 2020-05-14

## 2020-05-14 RX ORDER — HALOPERIDOL DECANOATE 100 MG/ML
5 INJECTION INTRAMUSCULAR ONCE
Refills: 0 | Status: COMPLETED | OUTPATIENT
Start: 2020-05-14 | End: 2020-05-14

## 2020-05-14 RX ORDER — DIPHENHYDRAMINE HCL 50 MG
50 CAPSULE ORAL ONCE
Refills: 0 | Status: DISCONTINUED | OUTPATIENT
Start: 2020-05-15 | End: 2020-05-19

## 2020-05-14 RX ORDER — HALOPERIDOL DECANOATE 100 MG/ML
2.5 INJECTION INTRAMUSCULAR ONCE
Refills: 0 | Status: DISCONTINUED | OUTPATIENT
Start: 2020-05-14 | End: 2020-05-14

## 2020-05-14 RX ORDER — DIPHENHYDRAMINE HCL 50 MG
50 CAPSULE ORAL EVERY 6 HOURS
Refills: 0 | Status: DISCONTINUED | OUTPATIENT
Start: 2020-05-15 | End: 2020-05-19

## 2020-05-14 RX ADMIN — HALOPERIDOL DECANOATE 5 MILLIGRAM(S): 100 INJECTION INTRAMUSCULAR at 18:43

## 2020-05-14 RX ADMIN — HALOPERIDOL DECANOATE 5 MILLIGRAM(S): 100 INJECTION INTRAMUSCULAR at 15:24

## 2020-05-14 RX ADMIN — ONDANSETRON 4 MILLIGRAM(S): 8 TABLET, FILM COATED ORAL at 18:43

## 2020-05-14 NOTE — ED PROVIDER NOTE - PHYSICAL EXAMINATION
*GEN:   agitated, AOx2  *EYES:   pupils equally round and reactive to light, extra-occular movements intact  *HEENT:   dried blood in R nare; airway patent, moist mucosal membranes, full ROM neck  *CV:   regular rate and rhythm  *RESP:   clear to auscultation bilaterally, non-labored  *ABD:   soft, non-tender  *:   no cva/flank tenderness  *EXTREM:   no MSK tenderness, full ROM throughout, no leg swelling  *SKIN:   dry, intact  *NEURO:   AOx3, no focal weakness or loss of sensation
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
## 2020-05-14 NOTE — ED BEHAVIORAL HEALTH ASSESSMENT NOTE - SUMMARY
Patient is a 24 year old man, with reported psychiatric diagnoses of autism spectrum disorder and bipolar disorder, 4 prior psych hospitalizations since June 2018, recently hospitalized at [facility] from last November until Wednesday 5/15/19, had been on Zyprexa, Depakote, and Prolixn [place] in the past but stopped  meds in September, 2019;  brought in by EMS, activated by family after patient presented with increased agitation, paranoia, and manic behaviors.  Patient has not taken medicaiton since september, 2019 , is illogical, disorganized, paranoid, hyperverbal, with paranoid, grandiose delusions.  Patient requires inpatient admission for safety and stabilization of mood.  Patient unable to keep himself safe in the community at this time.

## 2020-05-14 NOTE — ED BEHAVIORAL HEALTH ASSESSMENT NOTE - HPI (INCLUDE ILLNESS QUALITY, SEVERITY, DURATION, TIMING, CONTEXT, MODIFYING FACTORS, ASSOCIATED SIGNS AND SYMPTOMS)
Patient is a 24 year old man, with reported psychiatric diagnoses of autism spectrum disorder and bipolar disorder, 5 prior psych hospitalizations since June 2018, recently hospitalized at [facility] from last November until last Wednesday 5/15/19, had been on Zyprexa, Depakote, and Prolixn [place] in the past but stopped  meds september, 2019 with 1 more admission in January, 2020 [facility], BIBEMS activated by family for increased agitation and psychosis in the context of medication non-compliance.    On current evaluation, the patient is hyperreligious with disorganized behavior, paranoia, thought disordered, with flight of ideas and grandiose delusions.  Patient was agitated on arrival and required Stat IM rescue medications;  Patient states he graduated the [employer] in 1996 and his name in Dr. Warren Jimenez;  During evaluation, patient is pounding on his chest and abdomen, stating he is "freeing my immune system, the bones, fluid, all free".   Multiple illogical statements, "The moon is shining, the sun is out".  Patient with hyperverbal, loud speech;  Fidgety and unable to stay in one spot in the bed, moving up and down.        Per collateral from patient's mother Kamille ([phone]), refer to  note but in short, mom has had a difficult time managing patient at home during the current health crisis.  He was sent to a farm in [place] to be allowed to shelter in place with having the ability to be outside.  Patient became increasingly agitated and unable to stay there.  He was brought home to stay with sister but patient went to his mother's home today and broke down the door to get his records.  He left and then came back, more agitated, mom called 911.  She reports he hasn't been taking medications since september, 2019.  Mom is unable to manage him at home but brother or sister will take him in. Patient is a 24 year old man, with reported psychiatric diagnoses of autism spectrum disorder and bipolar disorder, 5 prior psych hospitalizations since June 2018, recently hospitalized at [facility] from last November until last Wednesday 5/15/19, had been on Zyprexa, Depakote, and Prolixn [place] in the past but stopped  meds september, 2019 with 1 more admission in January, 2020 [facility], BIBEMS activated by family for increased agitation and psychosis in the context of medication non-compliance.      On current evaluation, the patient is hyperreligious with disorganized behavior, paranoia, thought disordered, with flight of ideas and grandiose delusions.  Patient was agitated on arrival and required Stat IM rescue medications;  Patient states he graduated the [employer] in 1996 and his name in Dr. Warren Jimenez;  During evaluation, patient is pounding on his chest and abdomen, stating he is "freeing my immune system, the bones, fluid, all free".   Multiple illogical statements, "The moon is shining, the sun is out".  Patient with hyperverbal, loud speech;  Fidgety and unable to stay in one spot in the bed, moving up and down.      Per collateral from patient's mother Kamille ([phone]), refer to  note but in short, mom has had a difficult time managing patient at home during the current health crisis.  He was sent to a farm in [place] to be allowed to shelter in place with having the ability to be outside.  Patient became increasingly agitated and unable to stay there.  He was brought home to stay with sister but patient went to his mother's home today and broke down the door to get his records.  He left and then came back, more agitated, mom called 911.  She reports he hasn't been taking medications since september, 2019.  Mom is unable to manage him at home but brother or sister will take him in.

## 2020-05-14 NOTE — ED BEHAVIORAL HEALTH ASSESSMENT NOTE - CASE SUMMARY
Patient is a 24 year old man, with reported psychiatric diagnoses of autism spectrum disorder and bipolar disorder, 5 prior psych hospitalizations since June 2018, recently hospitalized at [facility] from last November until last Wednesday 5/15/19, had been on Zyprexa, Depakote, and Prolixin [place] in the past but stopped  meds september, 2019 with 1 more admission in January, 2020 [facility], JEANNE activated by family for increased agitation and psychosis in the context of medication non-compliance.    Patient is an acute danger to self and others at this time.  Patient lacks insight and judgment into illness and remains an acute safety risk and warrants inpatient psychiatric hospitalization for safety and stabilization.
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
## 2020-05-14 NOTE — ED ADULT NURSE NOTE - NSIMPLEMENTINTERV_GEN_ALL_ED
Implemented All Fall Risk Interventions:  [place] to call system. Call bell, personal items and telephone within reach. Instruct patient to call for assistance. Room bathroom lighting operational. Non-slip footwear when patient is off stretcher. Physically safe environment: no spills, clutter or unnecessary equipment. Stretcher in lowest position, wheels locked, appropriate side rails in place. Provide visual cue, wrist band, yellow gown, etc. Monitor gait and stability. Monitor for mental status changes and reorient to person, place, and time. Review medications for side effects contributing to fall risk. Reinforce activity limits and safety measures with patient and family.
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
## 2020-05-14 NOTE — ED PROVIDER NOTE - CLINICAL SUMMARY MEDICAL DECISION MAKING FREE TEXT BOX
24M w/ autism - here because of escalating agitation and difficulty managing at home, will check medical workup, consult psych, consider admission for difficulty managing at home

## 2020-05-14 NOTE — ED BEHAVIORAL HEALTH ASSESSMENT NOTE - DESCRIPTION
Patient pounding on chest, irritable and agitated upon arrival;  required multiple rounds of stat rescue IM medications;  Patient calmer but unable to remain in one place, pacing and moving about the room/bed.  Vital Signs Last 24 Hrs  T(C): 36.4 (14 May 2020 17:21), Max: 36.4 (14 May 2020 17:21)  T(F): 97.6 (14 May 2020 17:21), Max: 97.6 (14 May 2020 17:21)  HR: 88 (14 May 2020 21:23) (87 - 92)  BP: 120/73 (14 May 2020 21:23) (114/64 - 120/73)  BP(mean): --  RR: 18 (14 May 2020 21:23) (14 - 20)  SpO2: 100% (14 May 2020 21:23) (98% - 100%) GERD per mother 24 year old single, [place] male domiciled with mom, no legal hx; no substance; Patient pounding on chest, irritable and agitated upon arrival;  required multiple rounds of stat rescue IM medications;  Patient calmer but unable to remain in one place, pacing and moving about the room/bed.    Vital Signs Last 24 Hrs  T(C): 36.4 (14 May 2020 17:21), Max: 36.4 (14 May 2020 17:21)  T(F): 97.6 (14 May 2020 17:21), Max: 97.6 (14 May 2020 17:21)  HR: 88 (14 May 2020 21:23) (87 - 92)  BP: 120/73 (14 May 2020 21:23) (114/64 - 120/73)  BP(mean): --  RR: 18 (14 May 2020 21:23) (14 - 20)  SpO2: 100% (14 May 2020 21:23) (98% - 100%)

## 2020-05-14 NOTE — ED PROVIDER NOTE - PROGRESS NOTE DETAILS
John Kevin PGY3: John Kevin PGY3: d/w sister who provided bulk of history John Kevin PGY3: d/w sister who provided history, also w/ SW who agreed w/ story John Kevni PGY3: had episode vomiting, ordered zofran, still pending CTH to be done for med clearance for BH John Kevin PGY3: consulted

## 2020-05-14 NOTE — ED BEHAVIORAL HEALTH ASSESSMENT NOTE - PAST PSYCHOTROPIC MEDICATION
olanzapine 20mg BID, Depakote 750mg BID, and prolixin [place] next due on 5/24/19. Patient has refused PO meds since last Wednesday 5/15/19

## 2020-05-14 NOTE — ED BEHAVIORAL HEALTH ASSESSMENT NOTE - RISK ASSESSMENT
Patient is at high acute risk of harm to self and/or others and requires inpatient hospitalization for stabilization and safety. Risk factors include acute thought disorder, paranoia, severe agitation/anxiety, noncompliance with meds, not engaged with treatment. No need for 1;1 CO as denies SI/HI and was able to maintain better behavioral control during stay in the ED. Family have acute safety concern. Unable to determine Suicide Risk

## 2020-05-14 NOTE — ED BEHAVIORAL HEALTH ASSESSMENT NOTE - OTHER PAST PSYCHIATRIC HISTORY (INCLUDE DETAILS REGARDING ONSET, COURSE OF ILLNESS, INPATIENT/OUTPATIENT TREATMENT)
reported psychiatric diagnoses of autism spectrum disorder and bipolar disorder, 4 prior psych hospitalizations since June 2018, hospitalized at [facility] from last November until last Wednesday 5/15/19, [facility] january 2020

## 2020-05-14 NOTE — ED BEHAVIORAL HEALTH ASSESSMENT NOTE - PSYCHIATRIC ISSUES AND PLAN (INCLUDE STANDING AND PRN MEDICATION)
Psycosis: zyprexa 10 mg hs;  consider primary mood stabilizer, patient on depakote in the past with fair results.. PRN agitation: Haldol 5mg PO/IM, Benadryl 50mg PO/IM, Ativan 2mg PO/IM. Psychosis: zyprexa 10 mg hs;  consider primary mood stabilizer, patient on depakote in the past with fair results.. PRN agitation: Haldol 5mg PO/IM, Benadryl 50mg PO/IM, Ativan 2mg PO/IM.

## 2020-05-14 NOTE — ED PEDIATRIC TRIAGE NOTE - OTHER COMPLAINTS
Dr. Walton at ambulance bay assessing patient. Cleared for transport to [facility] adult ED. Accompanied by RN and EDT and EMS with O2 and ambu bag. Signout to charge RN completed. Attending to [facility] ED provider signout completed.

## 2020-05-14 NOTE — ED BEHAVIORAL HEALTH ASSESSMENT NOTE - PRIMARY DX
Bipolar affective disorder, current episode manic with psychotic symptoms Deferred condition on axis II

## 2020-05-14 NOTE — ED STATDOCS - OBJECTIVE STATEMENT
25 y/o male with h/o Schizophrenia and Autism here for aggressive behavior evaluation. Brought in by [facility] after giving 300mg IM Ketamine for agitation 30 mins earlier. Stable vitals, clear lungs, normal heart sounds. Transferred to Adult ER for further evaluation and management.

## 2020-05-14 NOTE — ED BEHAVIORAL HEALTH ASSESSMENT NOTE - VIOLENCE RISK FACTORS:
Irritability/Violent ideation/threat/speech/Impulsivity/Noncompliance with treatment/Affective dysregulation/Lack of insight into violence risk/need for treatment

## 2020-05-14 NOTE — ED ADULT NURSE NOTE - ED STAT RN HANDOFF DETAILS
Report given to VIVEK Watkins, pt going to low 3, awaiting legals from psychiatry, will continue to monitor.

## 2020-05-14 NOTE — ED PEDIATRIC TRIAGE NOTE - CHIEF COMPLAINT QUOTE
patient brought in by EMS for agitation, hx of autism spectrum disorder and schizophrenia. EMS states they gave 300 mg IM Ketamine. Patient sleeping, maintaining O2 sat >95% on RA.

## 2020-05-14 NOTE — ED PROVIDER NOTE - ATTENDING CONTRIBUTION TO CARE
Agree with above, pt with increased agitation, sedated with EMS with 300mg ketamine for aggressive and violent behavior, given 5mg haldol IM on ED arrival, will obtain labs [facility] to r/o medical causes and have psych eval patient for admission

## 2020-05-14 NOTE — ED PROVIDER NOTE - OBJECTIVE STATEMENT
24M w/ pmh autism spectrum disorder - p/w     Sister: Marcia, [phone]  SW: [phone] 24M w/ pmh autism spectrum disorder, bipolar with psychotic features - sent by family for jorden and agitation. 5/5 went to [facility], observed there for a few hours. Patient was admitted to [facility] ([facility]) 5/8 w/ labile mood, rambling speech, tangental and disorganized thought process. no report of cough, fever, vomiting.    Per sister Marcia: has been more difficult x 3 weeks, moved to an apartment in [place] with sister at that time and was being denied access to attention. 2 months ago went to [facility] in [place].     Here today because family feel unable to take care of patient at home any more. Today, patient was agitated, tried breaking into mother's apartment, banging into door.    Sister: Marcia, [phone]  SW: [phone]

## 2020-05-15 DIAGNOSIS — R45.1 RESTLESSNESS AND AGITATION: ICD-10-CM

## 2020-05-15 PROCEDURE — 99221 1ST HOSP IP/OBS SF/LOW 40: CPT

## 2020-05-15 RX ORDER — LANOLIN ALCOHOL/MO/W.PET/CERES
3 CREAM (GRAM) TOPICAL ONCE
Refills: 0 | Status: DISCONTINUED | OUTPATIENT
Start: 2020-05-15 | End: 2020-05-15

## 2020-05-15 RX ORDER — OLANZAPINE 15 MG/1
10 TABLET, FILM COATED ORAL AT BEDTIME
Refills: 0 | Status: DISCONTINUED | OUTPATIENT
Start: 2020-05-15 | End: 2020-05-15

## 2020-05-15 RX ORDER — LANOLIN ALCOHOL/MO/W.PET/CERES
3 CREAM (GRAM) TOPICAL AT BEDTIME
Refills: 0 | Status: DISCONTINUED | OUTPATIENT
Start: 2020-05-15 | End: 2020-05-19

## 2020-05-15 RX ADMIN — Medication 3 MILLIGRAM(S): at 22:39

## 2020-05-15 RX ADMIN — Medication 2 MILLIGRAM(S): at 22:40

## 2020-05-16 PROCEDURE — 99232 SBSQ HOSP IP/OBS MODERATE 35: CPT

## 2020-05-16 RX ADMIN — Medication 2 MILLIGRAM(S): at 19:00

## 2020-05-16 RX ADMIN — Medication 3 MILLIGRAM(S): at 19:00

## 2020-05-17 PROCEDURE — 99232 SBSQ HOSP IP/OBS MODERATE 35: CPT

## 2020-05-18 PROCEDURE — 99232 SBSQ HOSP IP/OBS MODERATE 35: CPT

## 2020-05-19 VITALS — TEMPERATURE: 97 F

## 2020-05-19 PROCEDURE — 99238 HOSP IP/OBS DSCHRG MGMT 30/<: CPT

## 2020-05-19 RX ORDER — LANOLIN ALCOHOL/MO/W.PET/CERES
1 CREAM (GRAM) TOPICAL
Qty: 0 | Refills: 0 | DISCHARGE
Start: 2020-05-19

## 2020-05-19 RX ADMIN — Medication 3 MILLIGRAM(S): at 00:53

## 2023-01-01 NOTE — ED BEHAVIORAL HEALTH ASSESSMENT NOTE - CASE SUMMARY
yes Patient is a 23 year old man, with reported psychiatric diagnoses of autism spectrum disorder and bipolar disorder, 4 prior psych hospitalizations since June 2018, recently hospitalized at [facility] from last November until last Wednesday 5/15/19, had been on Zyprexa, Depakote, and Prolixn [place] in the past but stopped PO meds recently, BIBEMS activated by family for hyperreligious and disorganized behavior as well as escalating aggression, agitation and insomnia. Patient has spent much of the 6 months in psychiatric admission w decompensated psychosis. pt is high acute risk and requires inpatient psychiatric admission for treatment and stabilization.

## 2023-07-31 NOTE — ED BEHAVIORAL HEALTH ASSESSMENT NOTE - ACTIVATING EVENTS/STRESSORS
[facility] Call Center    Phone Message    May a detailed message be left on voicemail: yes     Reason for Call: Patient needs to see Dr Méndez for hair loss in October. Writer unable to schedule until Feb 2024. Writer insists she needs to be seen every 3 months. Please call back [phone] Thank you    Action Taken: Message routed to:  [facility] Derm    Travel Screening: Not Applicable                                                                   
Pt scheduled.  
Non-compliant or not receiving treatment